# Patient Record
Sex: MALE | Race: WHITE | NOT HISPANIC OR LATINO | URBAN - METROPOLITAN AREA
[De-identification: names, ages, dates, MRNs, and addresses within clinical notes are randomized per-mention and may not be internally consistent; named-entity substitution may affect disease eponyms.]

---
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
## 2025-01-30 ENCOUNTER — HOSPITAL ENCOUNTER (OUTPATIENT)
Facility: MEDICAL CENTER | Age: 20
End: 2025-01-30
Attending: NURSE PRACTITIONER
Payer: COMMERCIAL

## 2025-01-30 ENCOUNTER — OFFICE VISIT (OUTPATIENT)
Dept: URGENT CARE | Facility: CLINIC | Age: 20
End: 2025-01-30
Payer: COMMERCIAL

## 2025-01-30 ENCOUNTER — HOSPITAL ENCOUNTER (OUTPATIENT)
Dept: LAB | Facility: MEDICAL CENTER | Age: 20
End: 2025-01-30
Attending: NURSE PRACTITIONER
Payer: COMMERCIAL

## 2025-01-30 VITALS
WEIGHT: 192 LBS | SYSTOLIC BLOOD PRESSURE: 124 MMHG | DIASTOLIC BLOOD PRESSURE: 72 MMHG | HEART RATE: 87 BPM | BODY MASS INDEX: 30.13 KG/M2 | HEIGHT: 67 IN | OXYGEN SATURATION: 99 % | TEMPERATURE: 97.4 F | RESPIRATION RATE: 16 BRPM

## 2025-01-30 DIAGNOSIS — R21 RASH: ICD-10-CM

## 2025-01-30 DIAGNOSIS — R30.0 DYSURIA: ICD-10-CM

## 2025-01-30 DIAGNOSIS — Z11.3 SCREENING EXAMINATION FOR STD (SEXUALLY TRANSMITTED DISEASE): ICD-10-CM

## 2025-01-30 PROBLEM — S82.51XA DISPLACED FRACTURE OF MEDIAL MALLEOLUS OF RIGHT TIBIA, INITIAL ENCOUNTER FOR CLOSED FRACTURE: Status: ACTIVE | Noted: 2019-08-13

## 2025-01-30 LAB
APPEARANCE UR: CLEAR
BILIRUB UR STRIP-MCNC: NEGATIVE MG/DL
COLOR UR AUTO: YELLOW
GLUCOSE UR STRIP.AUTO-MCNC: NEGATIVE MG/DL
HCV AB SER QL: NORMAL
HIV 1+2 AB+HIV1 P24 AG SERPL QL IA: NORMAL
KETONES UR STRIP.AUTO-MCNC: NEGATIVE MG/DL
LEUKOCYTE ESTERASE UR QL STRIP.AUTO: NEGATIVE
NITRITE UR QL STRIP.AUTO: NEGATIVE
PH UR STRIP.AUTO: 6 [PH] (ref 5–8)
PROT UR QL STRIP: NEGATIVE MG/DL
RBC UR QL AUTO: NEGATIVE
S PYO DNA SPEC NAA+PROBE: NORMAL
SP GR UR STRIP.AUTO: 1.02
T PALLIDUM AB SER QL IA: NORMAL
UROBILINOGEN UR STRIP-MCNC: NORMAL MG/DL

## 2025-01-30 PROCEDURE — 87086 URINE CULTURE/COLONY COUNT: CPT | Mod: 91

## 2025-01-30 PROCEDURE — 87591 N.GONORRHOEAE DNA AMP PROB: CPT

## 2025-01-30 PROCEDURE — 87070 CULTURE OTHR SPECIMN AEROBIC: CPT

## 2025-01-30 PROCEDURE — 86803 HEPATITIS C AB TEST: CPT

## 2025-01-30 PROCEDURE — 86780 TREPONEMA PALLIDUM: CPT

## 2025-01-30 PROCEDURE — 87086 URINE CULTURE/COLONY COUNT: CPT

## 2025-01-30 PROCEDURE — 87491 CHLMYD TRACH DNA AMP PROBE: CPT

## 2025-01-30 PROCEDURE — 36415 COLL VENOUS BLD VENIPUNCTURE: CPT

## 2025-01-30 PROCEDURE — 87389 HIV-1 AG W/HIV-1&-2 AB AG IA: CPT

## 2025-01-30 NOTE — PROGRESS NOTES
Chief Complaint   Patient presents with    UTI    Rash         HISTORY OF PRESENT ILLNESS: Patient is a pleasant 19 y.o. male who presents to urgent care today with skin and urinary complaints.  Patient notes that for the past 2 weeks he has not had a rash when he becomes overheated.  Rash improves when he cools down.  The rash is itchy.  He has not tried a medication for symptom relief.  Denies previous history of same.  Also reports 2 days of urinary burning.  Denies any abnormal discharge, rash, sores, lesions, testicular pain or swelling.  He is sexually active with 1 female partner, she is without symptoms.  He would like STI testing today.    Patient Active Problem List    Diagnosis Date Noted    Displaced fracture of medial malleolus of right tibia, initial encounter for closed fracture 08/13/2019       Allergies:Patient has no known allergies.    Current Outpatient Medications Ordered in Epic   Medication Sig Dispense Refill    Pseudoephedrine-APAP-DM (DAYQUIL PO) Take  by mouth. (Patient not taking: Reported on 1/30/2025)      lidocaine (XYLOCAINE) 2 % Solution Take 5 mL by mouth every four hours as needed for Throat/Mouth Pain. (Patient not taking: Reported on 1/30/2025) 100 mL 0     No current Epic-ordered facility-administered medications on file.       History reviewed. No pertinent past medical history.    Social History     Tobacco Use    Smoking status: Never    Smokeless tobacco: Never   Vaping Use    Vaping status: Never Used   Substance Use Topics    Alcohol use: Not Currently    Drug use: Not Currently       No family status information on file.   History reviewed. No pertinent family history.    ROS:  Review of Systems   Constitutional: Negative for fever, chills, weight loss, malaise, and fatigue.   HENT: Negative for ear pain, nosebleeds, congestion, sore throat and neck pain.    Eyes: Negative for vision changes.   Neuro: Negative for headache, sensory changes, weakness, seizure, LOC.  "  Cardiovascular: Negative for chest pain, palpitations, orthopnea and leg swelling.   Respiratory: Negative for cough, sputum production, shortness of breath and wheezing.   Gastrointestinal: Negative for abdominal pain, nausea, vomiting or diarrhea.   Genitourinary: Positive for dysuria.   Negative for discharge, testicular changes, urgency and frequency.  Musculoskeletal: Negative for falls, neck pain, back pain, joint pain, myalgias.   Skin: Positive for rash.   Negative for diaphoresis.     Exam:  /72   Pulse 87   Temp 36.3 °C (97.4 °F) (Temporal)   Resp 16   Ht 1.702 m (5' 7\")   Wt 87.1 kg (192 lb)   SpO2 99%   General: well-nourished, well-developed male in NAD  Head: normocephalic, atraumatic  Eyes: PERRLA, no conjunctival injection, acuity grossly intact, lids normal.  Ears: normal shape and symmetry, no tenderness, no discharge. External canals are without any significant edema or erythema. Tympanic membranes are without any inflammation, no effusion. Gross auditory acuity is intact.  Nose: symmetrical without tenderness, no discharge.  Mouth/Throat: reasonable hygiene, no erythema, exudates or tonsillar enlargement.  Neck: no masses, range of motion within normal limits, no tracheal deviation. No obvious thyroid enlargement.   Lymph: no cervical adenopathy. No supraclavicular adenopathy.   Neuro: alert and oriented. Cranial nerves 1-12 grossly intact. No sensory deficit.   Cardiovascular: regular rate and rhythm. No edema.  Pulmonary: no distress. Chest is symmetrical with respiration, no wheezes, crackles, or rhonchi.   Musculoskeletal: no clubbing, appropriate muscle tone, gait is stable.  Skin: warm, dry, intact, no clubbing, no cyanosis, no rashes noted at this time.  Psych: appropriate mood, affect, judgement.         Assessment/Plan:  1. Dysuria  POCT Urinalysis    HIV ANTIBODIES    POCT GROUP A STREP, PCR    RPR (SYPHILIS)    URINE CULTURE(NEW)    Chlamydia/GC, PCR (Urine)      2. " Screening examination for STD (sexually transmitted disease)  HIV ANTIBODIES    RPR (SYPHILIS)    URINE CULTURE(NEW)    Chlamydia/GC, PCR (Urine)    HEP C VIRUS ANTIBODY      3. Rash  HIV ANTIBODIES    POCT GROUP A STREP, PCR    RPR (SYPHILIS)    URINE CULTURE(NEW)    Chlamydia/GC, PCR (Urine)    HEP C VIRUS ANTIBODY          The patient is a pleasant 19-year-old who presents with urinary symptoms as well as rash.  Rash is transient, worsens with heat exposure.  I have encouraged patient to refrain from exposure to heat.  Hypoallergenic products encouraged.  He may trial OTC allergy medication as well.  Regarding urinary symptoms, urine negative today for any abnormalities, will send for culture and GC.  Furthermore, HIV, syphilis, and hepatitis testing ordered for patient.  Strep ordered for patient, in clinic, due to rash.  Unfortunately strep test is not working in clinic, therefore we will send for culture.  Supportive care, differential diagnoses, and indications for immediate follow-up discussed with patient.   Pathogenesis of diagnosis discussed including typical length and natural progression.   Instructed to return to clinic or nearest emergency department for any change in condition, further concerns, or worsening of symptoms.  Patient states understanding of the plan of care and discharge instructions.  Instructed to make an appointment, for follow up, with his primary care provider.        Please note that this dictation was created using voice recognition software. I have made every reasonable attempt to correct obvious errors, but I expect that there are errors of grammar and possibly content that I did not discover before finalizing the note.      ROQUE Emery.

## 2025-01-31 LAB
BACTERIA SPEC RESP CULT: NORMAL
BACTERIA UR CULT: NORMAL
BACTERIA UR CULT: NORMAL
C TRACH DNA SPEC QL NAA+PROBE: NEGATIVE
N GONORRHOEA DNA SPEC QL NAA+PROBE: NEGATIVE
SIGNIFICANT IND 70042: NORMAL
SITE SITE: NORMAL
SOURCE SOURCE: NORMAL
SPECIMEN SOURCE: NORMAL

## 2025-02-01 LAB
BACTERIA SPEC RESP CULT: NORMAL
BACTERIA UR CULT: NORMAL
BACTERIA UR CULT: NORMAL
SIGNIFICANT IND 70042: NORMAL
SITE SITE: NORMAL
SOURCE SOURCE: NORMAL